# Patient Record
Sex: FEMALE | Race: WHITE | NOT HISPANIC OR LATINO
[De-identification: names, ages, dates, MRNs, and addresses within clinical notes are randomized per-mention and may not be internally consistent; named-entity substitution may affect disease eponyms.]

---

## 2021-09-16 PROBLEM — Z82.49 FAMILY HISTORY OF HYPERTENSION: Status: ACTIVE | Noted: 2021-09-16

## 2021-09-16 PROBLEM — Z83.3 FAMILY HISTORY OF TYPE 2 DIABETES MELLITUS: Status: ACTIVE | Noted: 2021-09-16

## 2021-09-16 PROBLEM — Z82.3 FAMILY HISTORY OF CEREBROVASCULAR ACCIDENT (CVA): Status: ACTIVE | Noted: 2021-09-16

## 2021-09-16 PROBLEM — Z86.018 HISTORY OF UTERINE LEIOMYOMA: Status: RESOLVED | Noted: 2021-09-16 | Resolved: 2021-09-16

## 2021-09-17 ENCOUNTER — APPOINTMENT (OUTPATIENT)
Dept: ELECTROPHYSIOLOGY | Facility: CLINIC | Age: 51
End: 2021-09-17
Payer: COMMERCIAL

## 2021-09-17 ENCOUNTER — NON-APPOINTMENT (OUTPATIENT)
Age: 51
End: 2021-09-17

## 2021-09-17 VITALS — WEIGHT: 245 LBS

## 2021-09-17 VITALS
BODY MASS INDEX: 47.85 KG/M2 | HEIGHT: 60 IN | DIASTOLIC BLOOD PRESSURE: 78 MMHG | SYSTOLIC BLOOD PRESSURE: 122 MMHG | HEART RATE: 63 BPM | OXYGEN SATURATION: 98 %

## 2021-09-17 DIAGNOSIS — Z78.9 OTHER SPECIFIED HEALTH STATUS: ICD-10-CM

## 2021-09-17 DIAGNOSIS — Z82.49 FAMILY HISTORY OF ISCHEMIC HEART DISEASE AND OTHER DISEASES OF THE CIRCULATORY SYSTEM: ICD-10-CM

## 2021-09-17 DIAGNOSIS — Z80.3 FAMILY HISTORY OF MALIGNANT NEOPLASM OF BREAST: ICD-10-CM

## 2021-09-17 DIAGNOSIS — Z86.018 PERSONAL HISTORY OF OTHER BENIGN NEOPLASM: ICD-10-CM

## 2021-09-17 DIAGNOSIS — Z82.3 FAMILY HISTORY OF STROKE: ICD-10-CM

## 2021-09-17 DIAGNOSIS — R00.2 PALPITATIONS: ICD-10-CM

## 2021-09-17 DIAGNOSIS — Z83.3 FAMILY HISTORY OF DIABETES MELLITUS: ICD-10-CM

## 2021-09-17 PROCEDURE — 99203 OFFICE O/P NEW LOW 30 MIN: CPT

## 2021-09-17 PROCEDURE — 93000 ELECTROCARDIOGRAM COMPLETE: CPT

## 2021-09-17 NOTE — DISCUSSION/SUMMARY
[FreeTextEntry1] : 51 year old female with past medical hx of obesity, uterine fibroids, mild mitral regurgitation, presents for evaluation and management of palpitations.  \par \par 1. Palpitations: ECG obtained to evaluate for arrhythmias that could be causing symptoms, but showed normal sinus rhythm.  30 day monitor arranged for patient but she only wore for 7 day and she did not have to cough to stop the arrhythmia during the monitoring period.  We will therefore send the monitor back to patient such that she can record her most significant symptom particularly the one that she stops with coughing. Patient did have PVCs and one couplet, and reassured patient so far that there was not anything life threatening.  She had a normal nuclear stress test and echocardiogram. \par \par \par Sincerely,\par \par Cody Madera MD PROVIDER:[TOKEN:[[id number]]]

## 2021-09-17 NOTE — PHYSICAL EXAM
[Well Developed] : well developed [Well Nourished] : well nourished [No Acute Distress] : no acute distress [Obese] : obese [Normal Conjunctiva] : normal conjunctiva [Normal Venous Pressure] : normal venous pressure [No Carotid Bruit] : no carotid bruit [Normal S1, S2] : normal S1, S2 [No Murmur] : no murmur [No Rub] : no rub [No Gallop] : no gallop [Clear Lung Fields] : clear lung fields [Good Air Entry] : good air entry [No Respiratory Distress] : no respiratory distress  [Soft] : abdomen soft [Non Tender] : non-tender [No Masses/organomegaly] : no masses/organomegaly [Normal Bowel Sounds] : normal bowel sounds [Normal Gait] : normal gait [No Cyanosis] : no cyanosis [No Clubbing] : no clubbing [No Varicosities] : no varicosities [No Rash] : no rash [No Skin Lesions] : no skin lesions [Moves all extremities] : moves all extremities [No Focal Deficits] : no focal deficits [Normal Speech] : normal speech [Alert and Oriented] : alert and oriented [Normal memory] : normal memory [de-identified] : mild ankle edema

## 2021-09-17 NOTE — HISTORY OF PRESENT ILLNESS
[FreeTextEntry1] : 51 year old female with past medical hx of obesity, uterine fibroids, mild mitral regurgitation, presents for evaluation and management of palpitations.  \par \par Patient reports palpitations x one year described as palpitations at throat in which she can feel heart pounding. symptom last for 10 years, can occur 1-2x daily and sometimes none. palpitations is relieved with cough. \par \par Patient wore a Holter monitor for 7 days. lowest HR 50bpm, avg HR 71bpm and highest HR 120bpm. 1% PACs and 1% PVCs. Patient had one symptomatic event on 8/18/21 when she had heart racing. sinus rhythm with ventricular couplet and PAC noted on Holter monitor. \par \par As per the patient, she had an echo with Dr. Paredes 10/2020 which was normal.

## 2022-02-20 ENCOUNTER — INPATIENT (INPATIENT)
Facility: HOSPITAL | Age: 52
LOS: 1 days | Discharge: HOME | DRG: 372 | End: 2022-02-22
Attending: SURGERY | Admitting: SURGERY
Payer: COMMERCIAL

## 2022-02-20 VITALS
RESPIRATION RATE: 20 BRPM | DIASTOLIC BLOOD PRESSURE: 76 MMHG | HEART RATE: 83 BPM | OXYGEN SATURATION: 97 % | HEIGHT: 60 IN | WEIGHT: 240.08 LBS | TEMPERATURE: 98 F | SYSTOLIC BLOOD PRESSURE: 123 MMHG

## 2022-02-20 DIAGNOSIS — Z90.89 ACQUIRED ABSENCE OF OTHER ORGANS: Chronic | ICD-10-CM

## 2022-02-20 DIAGNOSIS — K37 UNSPECIFIED APPENDICITIS: ICD-10-CM

## 2022-02-20 DIAGNOSIS — Z98.89 OTHER SPECIFIED POSTPROCEDURAL STATES: Chronic | ICD-10-CM

## 2022-02-20 LAB
ALBUMIN SERPL ELPH-MCNC: 4.3 G/DL — SIGNIFICANT CHANGE UP (ref 3.3–5)
ALP SERPL-CCNC: 72 U/L — SIGNIFICANT CHANGE UP (ref 40–120)
ALT FLD-CCNC: 40 U/L — SIGNIFICANT CHANGE UP (ref 10–45)
ANION GAP SERPL CALC-SCNC: 13 MMOL/L — SIGNIFICANT CHANGE UP (ref 5–17)
APPEARANCE UR: CLEAR — SIGNIFICANT CHANGE UP
APTT BLD: 29.1 SEC — SIGNIFICANT CHANGE UP (ref 27.5–35.5)
AST SERPL-CCNC: 17 U/L — SIGNIFICANT CHANGE UP (ref 10–40)
BACTERIA # UR AUTO: NEGATIVE — SIGNIFICANT CHANGE UP
BASOPHILS # BLD AUTO: 0.05 K/UL — SIGNIFICANT CHANGE UP (ref 0–0.2)
BASOPHILS NFR BLD AUTO: 0.3 % — SIGNIFICANT CHANGE UP (ref 0–2)
BILIRUB SERPL-MCNC: 0.9 MG/DL — SIGNIFICANT CHANGE UP (ref 0.2–1.2)
BILIRUB UR-MCNC: NEGATIVE — SIGNIFICANT CHANGE UP
BLD GP AB SCN SERPL QL: NEGATIVE — SIGNIFICANT CHANGE UP
BUN SERPL-MCNC: 8 MG/DL — SIGNIFICANT CHANGE UP (ref 7–23)
CALCIUM SERPL-MCNC: 9.6 MG/DL — SIGNIFICANT CHANGE UP (ref 8.4–10.5)
CHLORIDE SERPL-SCNC: 96 MMOL/L — SIGNIFICANT CHANGE UP (ref 96–108)
CO2 SERPL-SCNC: 21 MMOL/L — LOW (ref 22–31)
COLOR SPEC: SIGNIFICANT CHANGE UP
CREAT SERPL-MCNC: 0.59 MG/DL — SIGNIFICANT CHANGE UP (ref 0.5–1.3)
DIFF PNL FLD: NEGATIVE — SIGNIFICANT CHANGE UP
EOSINOPHIL # BLD AUTO: 0.01 K/UL — SIGNIFICANT CHANGE UP (ref 0–0.5)
EOSINOPHIL NFR BLD AUTO: 0.1 % — SIGNIFICANT CHANGE UP (ref 0–6)
EPI CELLS # UR: 3 /HPF — SIGNIFICANT CHANGE UP
GLUCOSE SERPL-MCNC: 113 MG/DL — HIGH (ref 70–99)
GLUCOSE UR QL: NEGATIVE — SIGNIFICANT CHANGE UP
HCG SERPL-ACNC: <2 MIU/ML — SIGNIFICANT CHANGE UP
HCT VFR BLD CALC: 38.4 % — SIGNIFICANT CHANGE UP (ref 34.5–45)
HGB BLD-MCNC: 12 G/DL — SIGNIFICANT CHANGE UP (ref 11.5–15.5)
HIV 1+2 AB+HIV1 P24 AG SERPL QL IA: SIGNIFICANT CHANGE UP
HYALINE CASTS # UR AUTO: 1 /LPF — SIGNIFICANT CHANGE UP (ref 0–2)
IMM GRANULOCYTES NFR BLD AUTO: 0.6 % — SIGNIFICANT CHANGE UP (ref 0–1.5)
INR BLD: 1.14 RATIO — SIGNIFICANT CHANGE UP (ref 0.88–1.16)
KETONES UR-MCNC: NEGATIVE — SIGNIFICANT CHANGE UP
LEUKOCYTE ESTERASE UR-ACNC: NEGATIVE — SIGNIFICANT CHANGE UP
LIDOCAIN IGE QN: 13 U/L — SIGNIFICANT CHANGE UP (ref 7–60)
LYMPHOCYTES # BLD AUTO: 1.01 K/UL — SIGNIFICANT CHANGE UP (ref 1–3.3)
LYMPHOCYTES # BLD AUTO: 5.9 % — LOW (ref 13–44)
MCHC RBC-ENTMCNC: 25.7 PG — LOW (ref 27–34)
MCHC RBC-ENTMCNC: 31.3 GM/DL — LOW (ref 32–36)
MCV RBC AUTO: 82.2 FL — SIGNIFICANT CHANGE UP (ref 80–100)
MONOCYTES # BLD AUTO: 1.21 K/UL — HIGH (ref 0–0.9)
MONOCYTES NFR BLD AUTO: 7.1 % — SIGNIFICANT CHANGE UP (ref 2–14)
NEUTROPHILS # BLD AUTO: 14.64 K/UL — HIGH (ref 1.8–7.4)
NEUTROPHILS NFR BLD AUTO: 86 % — HIGH (ref 43–77)
NITRITE UR-MCNC: NEGATIVE — SIGNIFICANT CHANGE UP
NRBC # BLD: 0 /100 WBCS — SIGNIFICANT CHANGE UP (ref 0–0)
PH UR: 6 — SIGNIFICANT CHANGE UP (ref 5–8)
PLATELET # BLD AUTO: 354 K/UL — SIGNIFICANT CHANGE UP (ref 150–400)
POTASSIUM SERPL-MCNC: 4 MMOL/L — SIGNIFICANT CHANGE UP (ref 3.5–5.3)
POTASSIUM SERPL-SCNC: 4 MMOL/L — SIGNIFICANT CHANGE UP (ref 3.5–5.3)
PROT SERPL-MCNC: 7.1 G/DL — SIGNIFICANT CHANGE UP (ref 6–8.3)
PROT UR-MCNC: ABNORMAL
PROTHROM AB SERPL-ACNC: 13.6 SEC — SIGNIFICANT CHANGE UP (ref 10.6–13.6)
RBC # BLD: 4.67 M/UL — SIGNIFICANT CHANGE UP (ref 3.8–5.2)
RBC # FLD: 14.7 % — HIGH (ref 10.3–14.5)
RBC CASTS # UR COMP ASSIST: 12 /HPF — HIGH (ref 0–4)
RH IG SCN BLD-IMP: POSITIVE — SIGNIFICANT CHANGE UP
SARS-COV-2 RNA SPEC QL NAA+PROBE: SIGNIFICANT CHANGE UP
SODIUM SERPL-SCNC: 130 MMOL/L — LOW (ref 135–145)
SP GR SPEC: 1.02 — SIGNIFICANT CHANGE UP (ref 1.01–1.02)
UROBILINOGEN FLD QL: NEGATIVE — SIGNIFICANT CHANGE UP
WBC # BLD: 17.02 K/UL — HIGH (ref 3.8–10.5)
WBC # FLD AUTO: 17.02 K/UL — HIGH (ref 3.8–10.5)
WBC UR QL: 4 /HPF — SIGNIFICANT CHANGE UP (ref 0–5)

## 2022-02-20 PROCEDURE — 99285 EMERGENCY DEPT VISIT HI MDM: CPT

## 2022-02-20 PROCEDURE — 99222 1ST HOSP IP/OBS MODERATE 55: CPT

## 2022-02-20 PROCEDURE — 74177 CT ABD & PELVIS W/CONTRAST: CPT | Mod: 26,MD

## 2022-02-20 RX ORDER — PIPERACILLIN AND TAZOBACTAM 4; .5 G/20ML; G/20ML
3.38 INJECTION, POWDER, LYOPHILIZED, FOR SOLUTION INTRAVENOUS ONCE
Refills: 0 | Status: COMPLETED | OUTPATIENT
Start: 2022-02-20 | End: 2022-02-20

## 2022-02-20 RX ORDER — ENOXAPARIN SODIUM 100 MG/ML
40 INJECTION SUBCUTANEOUS EVERY 24 HOURS
Refills: 0 | Status: DISCONTINUED | OUTPATIENT
Start: 2022-02-20 | End: 2022-02-22

## 2022-02-20 RX ORDER — SODIUM CHLORIDE 9 MG/ML
1000 INJECTION, SOLUTION INTRAVENOUS
Refills: 0 | Status: ACTIVE | OUTPATIENT
Start: 2022-02-20 | End: 2023-01-19

## 2022-02-20 RX ORDER — METOCLOPRAMIDE HCL 10 MG
10 TABLET ORAL ONCE
Refills: 0 | Status: COMPLETED | OUTPATIENT
Start: 2022-02-20 | End: 2022-02-20

## 2022-02-20 RX ORDER — HYDROMORPHONE HYDROCHLORIDE 2 MG/ML
0.5 INJECTION INTRAMUSCULAR; INTRAVENOUS; SUBCUTANEOUS EVERY 4 HOURS
Refills: 0 | Status: DISCONTINUED | OUTPATIENT
Start: 2022-02-20 | End: 2022-02-21

## 2022-02-20 RX ORDER — MORPHINE SULFATE 50 MG/1
2 CAPSULE, EXTENDED RELEASE ORAL ONCE
Refills: 0 | Status: DISCONTINUED | OUTPATIENT
Start: 2022-02-20 | End: 2022-02-20

## 2022-02-20 RX ORDER — MORPHINE SULFATE 50 MG/1
4 CAPSULE, EXTENDED RELEASE ORAL ONCE
Refills: 0 | Status: DISCONTINUED | OUTPATIENT
Start: 2022-02-20 | End: 2022-02-20

## 2022-02-20 RX ORDER — HYDROMORPHONE HYDROCHLORIDE 2 MG/ML
0.25 INJECTION INTRAMUSCULAR; INTRAVENOUS; SUBCUTANEOUS EVERY 4 HOURS
Refills: 0 | Status: DISCONTINUED | OUTPATIENT
Start: 2022-02-20 | End: 2022-02-21

## 2022-02-20 RX ORDER — ONDANSETRON 8 MG/1
4 TABLET, FILM COATED ORAL ONCE
Refills: 0 | Status: COMPLETED | OUTPATIENT
Start: 2022-02-20 | End: 2022-02-20

## 2022-02-20 RX ORDER — PIPERACILLIN AND TAZOBACTAM 4; .5 G/20ML; G/20ML
3.38 INJECTION, POWDER, LYOPHILIZED, FOR SOLUTION INTRAVENOUS EVERY 8 HOURS
Refills: 0 | Status: DISCONTINUED | OUTPATIENT
Start: 2022-02-21 | End: 2022-02-22

## 2022-02-20 RX ORDER — SODIUM CHLORIDE 9 MG/ML
1000 INJECTION INTRAMUSCULAR; INTRAVENOUS; SUBCUTANEOUS ONCE
Refills: 0 | Status: COMPLETED | OUTPATIENT
Start: 2022-02-20 | End: 2022-02-20

## 2022-02-20 RX ORDER — ACETAMINOPHEN 500 MG
1000 TABLET ORAL ONCE
Refills: 0 | Status: COMPLETED | OUTPATIENT
Start: 2022-02-20 | End: 2022-02-20

## 2022-02-20 RX ORDER — ACETAMINOPHEN 500 MG
1000 TABLET ORAL EVERY 6 HOURS
Refills: 0 | Status: COMPLETED | OUTPATIENT
Start: 2022-02-20 | End: 2022-02-21

## 2022-02-20 RX ORDER — SODIUM CHLORIDE 9 MG/ML
1000 INJECTION, SOLUTION INTRAVENOUS
Refills: 0 | Status: DISCONTINUED | OUTPATIENT
Start: 2022-02-20 | End: 2022-02-21

## 2022-02-20 RX ADMIN — SODIUM CHLORIDE 125 MILLILITER(S): 9 INJECTION, SOLUTION INTRAVENOUS at 20:28

## 2022-02-20 RX ADMIN — Medication 1000 MILLIGRAM(S): at 14:34

## 2022-02-20 RX ADMIN — Medication 1000 MILLIGRAM(S): at 11:51

## 2022-02-20 RX ADMIN — SODIUM CHLORIDE 1000 MILLILITER(S): 9 INJECTION INTRAMUSCULAR; INTRAVENOUS; SUBCUTANEOUS at 11:27

## 2022-02-20 RX ADMIN — SODIUM CHLORIDE 1000 MILLILITER(S): 9 INJECTION INTRAMUSCULAR; INTRAVENOUS; SUBCUTANEOUS at 12:35

## 2022-02-20 RX ADMIN — ONDANSETRON 4 MILLIGRAM(S): 8 TABLET, FILM COATED ORAL at 11:30

## 2022-02-20 RX ADMIN — Medication 400 MILLIGRAM(S): at 11:36

## 2022-02-20 RX ADMIN — PIPERACILLIN AND TAZOBACTAM 200 GRAM(S): 4; .5 INJECTION, POWDER, LYOPHILIZED, FOR SOLUTION INTRAVENOUS at 15:58

## 2022-02-20 RX ADMIN — Medication 10 MILLIGRAM(S): at 14:38

## 2022-02-20 RX ADMIN — MORPHINE SULFATE 2 MILLIGRAM(S): 50 CAPSULE, EXTENDED RELEASE ORAL at 17:40

## 2022-02-20 NOTE — H&P ADULT - NSHPLABSRESULTS_GEN_ALL_CORE
12.0   17.02 )-----------( 354      ( 2022 11:55 )             38.4           130<L>  |  96  |  8   ----------------------------<  113<H>  4.0   |  21<L>  |  0.59    Ca    9.6      2022 11:55    TPro  7.1  /  Alb  4.3  /  TBili  0.9  /  DBili  x   /  AST  17  /  ALT  40  /  AlkPhos  72                Urinalysis Basic - ( 2022 11:55 )    Color: Light Yellow / Appearance: Clear / S.018 / pH: x  Gluc: x / Ketone: Negative  / Bili: Negative / Urobili: Negative   Blood: x / Protein: Trace / Nitrite: Negative   Leuk Esterase: Negative / RBC: 12 /hpf / WBC 4 /HPF   Sq Epi: x / Non Sq Epi: 3 /hpf / Bacteria: Negative        PT/INR - ( 2022 17:44 )   PT: 13.6 sec;   INR: 1.14 ratio         PTT - ( 2022 17:44 )  PTT:29.1 sec          RADIOLOGY AND ADDITIONAL STUDIES:         INTERPRETATION:  CLINICAL INFORMATION: Evaluate for appendicitis    COMPARISON: None.    CONTRAST/COMPLICATIONS:  IV Contrast: Omnipaque 350 90 cc administered   10 cc discarded  Oral Contrast: NONE  Complications: None reported at time of study completion    PROCEDURE:  CT of the Abdomen and Pelvis was performed.  Sagittal and coronal reformats were performed.        IMPRESSION:  Findings compatible with acute appendicitis. There are adjacent   inflammatory changes. Small microperforation cannot be excluded.    Multiple enlarged abdominal lymph nodes including the above-mentioned   mesenteric lymph nodes at the root of the mesentery, largest measuring 4   mm x 3.1 cm. Additional smaller retroperitoneal lymph nodes. Findings are   suggestive of lymphoma. Workup should be performed.    Enlarged bulbous uterus suggests adenomyosis and/or leiomyomatous   disease. Correlation with pelvic ultrasound should be performed. Small   hypodensity left adnexal region also can be evaluated with sonography.    1 cm left lower lobe pulmonary nodule. No previous chest CT examination   for comparison. In view of the findingswithin the abdomen, recommend   chest CT examination.    Findings discussed with Dr. Jeter by Dr. Crawford on 2022 at   3:01 PM.

## 2022-02-20 NOTE — H&P ADULT - ASSESSMENT
53yo F w/ hx of myomectomy presenting with 1 days of RLQ pain. CT a/p has findings compatible with acute appendicitis with additional findings of enlarged lymph notes     - Pt with significant inflammatory changes and suggestion of microperforation   - No acute surgical intervention   - IV abx   - Serial abdominal exams   - Pain control   - Discussed with Dr. Zen Harper PGY3  ATP   [id number]

## 2022-02-20 NOTE — ED PROVIDER NOTE - OBJECTIVE STATEMENT
51YO F hx fibroids, myomectomy, p/w RLQ abd pain x1d. nonradiating. a/w n/v. denies fevers, flank pain, dysuria, diarrhea. LMP 2w prior. last BM 2d prior, has not passed gas since then but no abd distention or abdominal surgical hx. 53YO F hx fibroids, myomectomy, p/w RLQ abd pain x1d. nonradiating. a/w n/v. denies fevers, flank pain, dysuria, diarrhea. LMP 2w prior. last BM 2d prior, has not passed gas since then but no abd distention or abdominal surgical hx.     Attn- pt seen in Rm17 - agree with above - pt c/o constant "pulling" pain 8/10 RLQ that started gradually yesterday am. pt had anorexia since night before.  Feels feverish, but no fever measured. no chills.  no resp or  symptoms.  decreased BM.  +N/V - NBNB,   no radiation.  started in RLQ.   LMP 2 weeks ago.  no trauma or injury to abdo.  PMHx - neg, PSHx - myomectomy.

## 2022-02-20 NOTE — H&P ADULT - NSICDXFAMILYHX_GEN_ALL_CORE_FT
FAMILY HISTORY:  FH: gastric cancer    Father  Still living? Unknown  Family history of type 2 diabetes mellitus in father, Age at diagnosis: Age Unknown  Hypertension, Age at diagnosis: Age Unknown    Mother  Still living? Yes, Estimated age: 71-80  Family history of stroke, Age at diagnosis: Age Unknown  FH: breast cancer, Age at diagnosis: Age Unknown  FH: gastric cancer, Age at diagnosis: Age Unknown  Hypertension, Age at diagnosis: Age Unknown    Sibling  Still living? Unknown  FH: breast cancer, Age at diagnosis: Age Unknown

## 2022-02-20 NOTE — ED PROVIDER NOTE - CLINICAL SUMMARY MEDICAL DECISION MAKING FREE TEXT BOX
Carol Jeter MD, PGY-2: 51YO F hx fibroids, myomectomy, p/w RLQ abd pain + n/v x1d. VSS, PE RLQ TTP. concern for appy, low suspicion mesenteric ischemia (not diffuse, no risk factors), low suspicion torsion given duration of symptoms and abd exam. plan for basic labs, ua, ct a/p, hydration. Carol Jeter MD, PGY-2: 53YO F hx fibroids, myomectomy, p/w RLQ abd pain + n/v x1d. VSS, PE RLQ TTP. concern for appy, low suspicion mesenteric ischemia (not diffuse, no risk factors), low suspicion torsion given duration of symptoms and abd exam. plan for basic labs, ua, ct a/p, hydration.     Attn - [facility] - likely acute AP - analgesia, CT abdo/pelvis, reassess.

## 2022-02-20 NOTE — H&P ADULT - NSHPPHYSICALEXAM_GEN_ALL_CORE
PHYSICAL EXAM:  GENERAL: NAD  HEAD:  Atraumatic, Normocephalic  EYES: EOMI, PERRLA, conjunctiva and sclera clear  ENT: Moist mucous membranes  NECK: Supple  CHEST/LUNG: Unlabored respirations  HEART: Regular rate and rhythm  ABDOMEN: Softly distended. RLQ moderately TTP. No rebound or guarding.   EXTREMITIES:  2+ Peripheral Pulses  NERVOUS SYSTEM:  Alert & Oriented X3, speech clear  SKIN: No rashes or lesions

## 2022-02-20 NOTE — ED ADULT NURSE REASSESSMENT NOTE - NS ED NURSE REASSESS COMMENT FT1
A/O x3. Still has pain on movement, 7/10, declines pain meds at present. Requesting something for nausea. MD notified.

## 2022-02-20 NOTE — ED ADULT NURSE NOTE - CHPI ED NUR SYMPTOMS POS
PHYSICIAN NEXT STEPS:  Review Only    CHIEF COMPLAINT:  Chief Complaint/Protocol Used: appointment  Onset: appointment      ASSESSMENT:  ? Onset: appointment  ? Normal True  -------------------------------------------------------    DISPOSITION:  Disposition Recommendation: Unspecified  Patient Directed To: Unspecified  Patient Intended Action: Other      CALL NOTES:  07/12/2021 at 9:46 AM by Dianelys Bower  ? Patient requests an appointment for a mammogram.  Transferred to [facility] for assistance.    DISPOSITION OVERRIDE/PROVIDER CONSULT:  Disposition Override: Call PCP Within 24 Hours  Override Source: Unspecified  Consulted with PCP: No  Consulted with On-Call Physician: No    CALLER CONTACT INFO:  Name: Michelle Reeves (Self)  Phone 1: [phone] (Mobile) - Preferred      ENCOUNTER STARTED:  07/12/21 09:33:52 AM  ENCOUNTER ASSIGNED TO/CLOSED BY:  Dianelys Bower @ 07/12/21 09:51:34 AM      -------------------------------------------------------    -------------------------------------------------------  
NAUSEA/PAIN/VOMITING

## 2022-02-20 NOTE — ED PROVIDER NOTE - PHYSICAL EXAMINATION
Gen: WDWN, NAD  HEENT: EOMI, no nasal discharge, mucous membranes mildly dry  CV: 2+ radial pulses b/l  Resp: no accessory muscle use, no increased work of breathing  GI: Abdomen soft non-distended, + RLQ TTP  MSK: No open wounds, no bruising, no LE edema  Neuro: A&Ox4, following commands, moving all four extremities spontaneously  Psych: appropriate mood Gen: WDWN, NAD  HEENT: EOMI, no nasal discharge, mucous membranes mildly dry  CV: 2+ radial pulses b/l  Resp: no accessory muscle use, no increased work of breathing  GI: Abdomen soft non-distended, + RLQ TTP  MSK: No open wounds, no bruising, no LE edema  Neuro: A&Ox4, following commands, moving all four extremities spontaneously  Psych: appropriate mood     Attn - alert, NAD, no pallor or jaundice, PERRL 3 mm, moist mm, skin - warm and dry, Lungs - clear, no w/r/r, good BS bilaterally, Cor - rr, no M, no rub, Abdo - obese, ND, soft, tender at [place]Burney's, +pain to RLQ with cough, no HSM, no CVAT, no guarding or rebound. Extremities - no edema, no calf tenderness, distal pulses intact and symmetrical, Neuro - intact and non-focal

## 2022-02-20 NOTE — ED PROVIDER NOTE - CHILD ABUSE FACILITY
Returned patient's call to reschedule [facility] appt from 10/9/17. Pt stated in message that she now has POI.  
[facility]

## 2022-02-20 NOTE — ED PROVIDER NOTE - PROGRESS NOTE DETAILS
Carol Jeter MD, PGY-2: surgery initially paged at 315, spoke with pt now, has evaluated the patient but has not spoken with attending. Carol Jeter MD, PGY-2: surgery evaluated the patient, accepted to their service. does not need to be NPO tonight. will plan for surgery tomorrow.

## 2022-02-20 NOTE — H&P ADULT - HISTORY OF PRESENT ILLNESS
51yo F w/ hx of myomectomy presenting with 1 days of RLQ pain. Pt states yesterday afternoon she suddenly developed severe RLQ pain with associated nausea and emesis. She states that since onset of symptoms she has been unable to tolerate PO 51yo F w/ hx of myomectomy presenting with 1 days of RLQ pain. Pt states yesterday afternoon she suddenly developed severe RLQ pain with associated nausea and emesis. She states that since onset of symptoms she has been unable to tolerate PO and has had "constant chills". She states she has noticed mild RLQ pain over the last year, however it was transient and notable less severe. Pt has not passed gas in a day but reports normal bowel habits. She denies fever, cp, sob, recent illness, hematochezia, urinary symptoms, unintentional weight loss, cough or night sweats. Colonoscopy at age 40 was WNL.     In ED pt afebrile and Hd stable. Labs remarkable for hyponatremia and WBC of 17. CT a/p has findings compatible with acute appendicitis. Small microperforation cannot be excluded. Also noted multiple enlarged abdominal lymph nodes and additional smaller retroperitoneal lymph nodes suggestive of lymphoma.

## 2022-02-20 NOTE — ED PROVIDER NOTE - NS ED ROS FT
Gen: Denies fevers  CV: Denies chest pain  Resp: Denies SOB  GI: + abd pain, nausea, vomiting  : Denies dysuria, flank pain  all other ROS negative unless indicated in HPI

## 2022-02-20 NOTE — H&P ADULT - BIRTH SEX
Female You can access the FollowMyHealth Patient Portal offered by [facility] by registering at the following website: [email or web address]. By joining [facility]’s FollowMyHealth portal, you will also be able to view your health information using other applications (apps) compatible with our system.

## 2022-02-20 NOTE — H&P ADULT - TIME BILLING
findings consistent with acute appendicitis likely small perforation  options discussed with patient  patient electing for attempted non operative management for this stage  will ask for oncology input regarding enlarged mesenteric lymph nodes  I.V. antibiotics, NPO for now with IVF

## 2022-02-20 NOTE — ED PROVIDER NOTE - NSICDXFAMILYHX_GEN_ALL_CORE_FT
FAMILY HISTORY:  Father  Still living? Unknown  Family history of type 2 diabetes mellitus in father, Age at diagnosis: Age Unknown  Hypertension, Age at diagnosis: Age Unknown    Mother  Still living? Yes, Estimated age: 71-80  Family history of stroke, Age at diagnosis: Age Unknown  Hypertension, Age at diagnosis: Age Unknown

## 2022-02-20 NOTE — ED ADULT NURSE NOTE - OBJECTIVE STATEMENT
Pt is a 53 yo F who came to the ED amb c/o abd pain since yesterday. Pain is in the right periumbilical area, a "pulling" sensation with vomiting and diarrhea. No blood noted. No pain at rest, intense pain with movement.+ chills, no fever. A/O x3.

## 2022-02-21 LAB
ANION GAP SERPL CALC-SCNC: 11 MMOL/L — SIGNIFICANT CHANGE UP (ref 5–17)
BUN SERPL-MCNC: 8 MG/DL — SIGNIFICANT CHANGE UP (ref 7–23)
CALCIUM SERPL-MCNC: 8.8 MG/DL — SIGNIFICANT CHANGE UP (ref 8.4–10.5)
CHLORIDE SERPL-SCNC: 101 MMOL/L — SIGNIFICANT CHANGE UP (ref 96–108)
CO2 SERPL-SCNC: 25 MMOL/L — SIGNIFICANT CHANGE UP (ref 22–31)
CREAT SERPL-MCNC: 0.73 MG/DL — SIGNIFICANT CHANGE UP (ref 0.5–1.3)
CULTURE RESULTS: SIGNIFICANT CHANGE UP
GLUCOSE SERPL-MCNC: 98 MG/DL — SIGNIFICANT CHANGE UP (ref 70–99)
HCT VFR BLD CALC: 35.3 % — SIGNIFICANT CHANGE UP (ref 34.5–45)
HGB BLD-MCNC: 11 G/DL — LOW (ref 11.5–15.5)
MAGNESIUM SERPL-MCNC: 2.1 MG/DL — SIGNIFICANT CHANGE UP (ref 1.6–2.6)
MCHC RBC-ENTMCNC: 25.9 PG — LOW (ref 27–34)
MCHC RBC-ENTMCNC: 31.2 GM/DL — LOW (ref 32–36)
MCV RBC AUTO: 83.3 FL — SIGNIFICANT CHANGE UP (ref 80–100)
NRBC # BLD: 0 /100 WBCS — SIGNIFICANT CHANGE UP (ref 0–0)
PHOSPHATE SERPL-MCNC: 2.9 MG/DL — SIGNIFICANT CHANGE UP (ref 2.5–4.5)
PLATELET # BLD AUTO: 262 K/UL — SIGNIFICANT CHANGE UP (ref 150–400)
POTASSIUM SERPL-MCNC: 3.9 MMOL/L — SIGNIFICANT CHANGE UP (ref 3.5–5.3)
POTASSIUM SERPL-SCNC: 3.9 MMOL/L — SIGNIFICANT CHANGE UP (ref 3.5–5.3)
RBC # BLD: 4.24 M/UL — SIGNIFICANT CHANGE UP (ref 3.8–5.2)
RBC # FLD: 14.9 % — HIGH (ref 10.3–14.5)
SODIUM SERPL-SCNC: 137 MMOL/L — SIGNIFICANT CHANGE UP (ref 135–145)
SPECIMEN SOURCE: SIGNIFICANT CHANGE UP
WBC # BLD: 13.68 K/UL — HIGH (ref 3.8–10.5)
WBC # FLD AUTO: 13.68 K/UL — HIGH (ref 3.8–10.5)

## 2022-02-21 PROCEDURE — 99231 SBSQ HOSP IP/OBS SF/LOW 25: CPT

## 2022-02-21 RX ORDER — INFLUENZA VIRUS VACCINE 15; 15; 15; 15 UG/.5ML; UG/.5ML; UG/.5ML; UG/.5ML
0.5 SUSPENSION INTRAMUSCULAR ONCE
Refills: 0 | Status: DISCONTINUED | OUTPATIENT
Start: 2022-02-21 | End: 2022-02-22

## 2022-02-21 RX ORDER — OXYCODONE HYDROCHLORIDE 5 MG/1
5 TABLET ORAL EVERY 4 HOURS
Refills: 0 | Status: DISCONTINUED | OUTPATIENT
Start: 2022-02-21 | End: 2022-02-22

## 2022-02-21 RX ORDER — SODIUM CHLORIDE 9 MG/ML
1000 INJECTION INTRAMUSCULAR; INTRAVENOUS; SUBCUTANEOUS
Refills: 0 | Status: DISCONTINUED | OUTPATIENT
Start: 2022-02-21 | End: 2022-02-21

## 2022-02-21 RX ADMIN — PIPERACILLIN AND TAZOBACTAM 25 GRAM(S): 4; .5 INJECTION, POWDER, LYOPHILIZED, FOR SOLUTION INTRAVENOUS at 17:01

## 2022-02-21 RX ADMIN — Medication 62.5 MILLIMOLE(S): at 11:49

## 2022-02-21 RX ADMIN — Medication 400 MILLIGRAM(S): at 11:01

## 2022-02-21 RX ADMIN — Medication 1000 MILLIGRAM(S): at 17:15

## 2022-02-21 RX ADMIN — PIPERACILLIN AND TAZOBACTAM 25 GRAM(S): 4; .5 INJECTION, POWDER, LYOPHILIZED, FOR SOLUTION INTRAVENOUS at 01:17

## 2022-02-21 RX ADMIN — Medication 400 MILLIGRAM(S): at 17:01

## 2022-02-21 RX ADMIN — Medication 1000 MILLIGRAM(S): at 00:29

## 2022-02-21 RX ADMIN — Medication 400 MILLIGRAM(S): at 05:32

## 2022-02-21 RX ADMIN — Medication 1000 MILLIGRAM(S): at 11:15

## 2022-02-21 RX ADMIN — PIPERACILLIN AND TAZOBACTAM 25 GRAM(S): 4; .5 INJECTION, POWDER, LYOPHILIZED, FOR SOLUTION INTRAVENOUS at 09:03

## 2022-02-21 RX ADMIN — Medication 400 MILLIGRAM(S): at 00:03

## 2022-02-21 RX ADMIN — ENOXAPARIN SODIUM 40 MILLIGRAM(S): 100 INJECTION SUBCUTANEOUS at 11:01

## 2022-02-21 RX ADMIN — SODIUM CHLORIDE 125 MILLILITER(S): 9 INJECTION INTRAMUSCULAR; INTRAVENOUS; SUBCUTANEOUS at 01:24

## 2022-02-21 NOTE — PROGRESS NOTE ADULT - ATTENDING COMMENTS
Pt seen and examined with ACS team on 2/21, agree with above. Pt feeling better, less pain than on presentation.    1. Uncomplicated appendicitis:  - Continue non-operative management. No appendicolith on CT, so no need for interval appendectomy unless patient has recurrent or prolonged symptoms.  - Will need outpatient colonoscopy in 6-8 weeks to ensure appendicitis was not caused by mass  - Advance to [facility]    2. Lymphadenopathy and pulmonary nodule:  - Heme-Onc consultation Pt seen and examined with ACS team on 2/21, agree with above. Pt feeling better, less pain than on presentation.    1. Uncomplicated appendicitis:  - Continue non-operative management. No appendicolith on CT, so no need for interval appendectomy unless patient has recurrent or prolonged symptoms.  - Will need outpatient colonoscopy in 6-8 weeks to ensure appendicitis was not caused by mass  - Advance to [facility]D    2. Lymphadenopathy and pulmonary nodule:  - Heme-Onc consultation  - I discussed these findings with patient - she was already aware and is amenable to planned Heme-Onc consultation

## 2022-02-21 NOTE — CONSULT NOTE ADULT - SUBJECTIVE AND OBJECTIVE BOX
CHIEF COMPLAINT: abd pain    HISTORY OF PRESENT ILLNESS:  51yo F w/ hx of myomectomy presenting with 1 days of RLQ pain. Pt states yesterday afternoon she suddenly developed severe RLQ pain with associated nausea and emesis. She states that since onset of symptoms she has been unable to tolerate PO and has had "constant chills". She states she has noticed mild RLQ pain over the last year, however it was transient and notable less severe. Pt has not passed gas in a day but reports normal bowel habits. She denies fever, cp, sob, recent illness, hematochezia, urinary symptoms, unintentional weight loss, cough or night sweats. Colonoscopy at age 40 was WNL.     In ED pt afebrile and Hd stable. Labs remarkable for hyponatremia and WBC of 17. CT a/p has findings compatible with acute appendicitis. Small microperforation cannot be excluded. Also noted multiple enlarged abdominal lymph nodes and additional smaller retroperitoneal lymph nodes suggestive of lymphoma.       Allergies    No Known Allergies    Intolerances    	    MEDICATIONS:  enoxaparin Injectable 40 milliGRAM(s) SubCutaneous every 24 hours    piperacillin/tazobactam IVPB.. 3.375 Gram(s) IV Intermittent every 8 hours      acetaminophen   IVPB .. 1000 milliGRAM(s) IV Intermittent every 6 hours  HYDROmorphone  Injectable 0.25 milliGRAM(s) IV Push every 4 hours PRN  HYDROmorphone  Injectable 0.5 milliGRAM(s) IV Push every 4 hours PRN        influenza   Vaccine 0.5 milliLiter(s) IntraMuscular once  lactated ringers. 1000 milliLiter(s) IV Continuous <Continuous>  sodium chloride 0.9%. 1000 milliLiter(s) IV Continuous <Continuous>      PAST MEDICAL & SURGICAL HISTORY:  Uterine leiomyoma, unspecified location    S/P tonsillectomy  Age 6    H/O colonoscopy  WNL    S/P LEEP of cervix        FAMILY HISTORY:  Hypertension (Father, Mother)  Mother &amp; Father - Alive ages 71 &amp; 73    Family history of stroke (Mother)  Mother - age 71    Family history of type 2 diabetes mellitus in father (Father)  Father - Alive age 73    FH: gastric cancer (Mother)    FH: gastric cancer    FH: breast cancer (Sibling)    FH: breast cancer (Mother)        SOCIAL HISTORY:    no toxic habits. indep in adl    REVIEW OF SYSTEMS:  See HPI, otherwise complete 10 point review of systems negative    [ ] All others negative	    PHYSICAL EXAM:  T(C): 36.8 (02-21-22 @ 03:19), Max: 37.8 (02-21-22 @ 00:00)  HR: 78 (02-21-22 @ 03:19) (72 - 94)  BP: 102/65 (02-21-22 @ 03:19) (102/65 - 155/83)  RR: 18 (02-21-22 @ 03:19) (16 - 20)  SpO2: 94% (02-21-22 @ 03:19) (93% - 97%)  Wt(kg): --  I&O's Summary      Appearance: No Acute Distress	  HEENT:  Normal oral mucosa, PERRL, EOMI	  Cardiovascular: Normal S1 S2, No JVD, No murmurs/rubs/gallops  Respiratory: Lungs clear to auscultation bilaterally  Gastrointestinal:  Soft, Non-tender, + BS	  Skin: No rashes, No ecchymoses, No cyanosis	  Neurologic: Non-focal  Extremities: No clubbing, cyanosis or edema  Vascular: Peripheral pulses palpable 2+ bilaterally  Psychiatry: A & O x 3, Mood & affect appropriate    Laboratory Data:	 	    CBC Full  -  ( 21 Feb 2022 06:56 )  WBC Count : 13.68 K/uL  Hemoglobin : 11.0 g/dL  Hematocrit : 35.3 %  Platelet Count - Automated : 262 K/uL  Mean Cell Volume : 83.3 fl  Mean Cell Hemoglobin : 25.9 pg  Mean Cell Hemoglobin Concentration : 31.2 gm/dL  Auto Neutrophil # : x  Auto Lymphocyte # : x  Auto Monocyte # : x  Auto Eosinophil # : x  Auto Basophil # : x  Auto Neutrophil % : x  Auto Lymphocyte % : x  Auto Monocyte % : x  Auto Eosinophil % : x  Auto Basophil % : x    02-21    137  |  101  |  8   ----------------------------<  98  3.9   |  25  |  0.73  02-20    130<L>  |  96  |  8   ----------------------------<  113<H>  4.0   |  21<L>  |  0.59    Ca    8.8      21 Feb 2022 06:56  Ca    9.6      20 Feb 2022 11:55  Phos  2.9     02-21  Mg     2.1     02-21    TPro  7.1  /  Alb  4.3  /  TBili  0.9  /  DBili  x   /  AST  17  /  ALT  40  /  AlkPhos  72  02-20      proBNP:   Lipid Profile:   HgA1c:   TSH:       CARDIAC MARKERS:            Interpretation of Telemetry: 	    ECG:  	  RADIOLOGY:  OTHER: 	    PREVIOUS DIAGNOSTIC TESTING:    [ ] Echocardiogram:  [ ] Catheterization:  [ ] Stress Test:  	    Assessment:  acute appendicitis  enlarged lymph nodes  cardiac management    Recs:  cardiac stable  iv abx per surgery  suggest heme/onc eval for KYLAH  eventual appendectomy  no further cardiac workup required prior to OR  care per surgery  dvt ppx        Greater than 60 minutes spent on total encounter; more than 50% of the visit was spent counseling and/or coordinating care by the attending physician.   	  Brendan Paredes MD   Cardiovascular Diseases  [phone]     CHIEF COMPLAINT: abd pain    HISTORY OF PRESENT ILLNESS:  51yo F w/ hx of myomectomy presenting with 1 days of RLQ pain. Pt states yesterday afternoon she suddenly developed severe RLQ pain with associated nausea and emesis. She states that since onset of symptoms she has been unable to tolerate PO and has had "constant chills". She states she has noticed mild RLQ pain over the last year, however it was transient and notable less severe. Pt has not passed gas in a day but reports normal bowel habits. She denies fever, cp, sob, recent illness, hematochezia, urinary symptoms, unintentional weight loss, cough or night sweats. Colonoscopy at age 40 was WNL.     In ED pt afebrile and Hd stable. Labs remarkable for hyponatremia and WBC of 17. CT a/p has findings compatible with acute appendicitis. Small microperforation cannot be excluded. Also noted multiple enlarged abdominal lymph nodes and additional smaller retroperitoneal lymph nodes suggestive of lymphoma.       Allergies    No Known Allergies    Intolerances    	    MEDICATIONS:  enoxaparin Injectable 40 milliGRAM(s) SubCutaneous every 24 hours    piperacillin/tazobactam IVPB.. 3.375 Gram(s) IV Intermittent every 8 hours      acetaminophen   IVPB .. 1000 milliGRAM(s) IV Intermittent every 6 hours  HYDROmorphone  Injectable 0.25 milliGRAM(s) IV Push every 4 hours PRN  HYDROmorphone  Injectable 0.5 milliGRAM(s) IV Push every 4 hours PRN        influenza   Vaccine 0.5 milliLiter(s) IntraMuscular once  lactated ringers. 1000 milliLiter(s) IV Continuous <Continuous>  sodium chloride 0.9%. 1000 milliLiter(s) IV Continuous <Continuous>      PAST MEDICAL & SURGICAL HISTORY:  Uterine leiomyoma, unspecified location    S/P tonsillectomy  Age 6    H/O colonoscopy  WNL    S/P LEEP of cervix        FAMILY HISTORY:  Hypertension (Father, Mother)  Mother &amp; Father - Alive ages 71 &amp; 73    Family history of stroke (Mother)  Mother - age 71    Family history of type 2 diabetes mellitus in father (Father)  Father - Alive age 73    FH: gastric cancer (Mother)    FH: gastric cancer    FH: breast cancer (Sibling)    FH: breast cancer (Mother)        SOCIAL HISTORY:    no toxic habits. indep in adl    REVIEW OF SYSTEMS:  See HPI, otherwise complete 10 point review of systems negative    [ ] All others negative	    PHYSICAL EXAM:  T(C): 36.8 (02-21-22 @ 03:19), Max: 37.8 (02-21-22 @ 00:00)  HR: 78 (02-21-22 @ 03:19) (72 - 94)  BP: 102/65 (02-21-22 @ 03:19) (102/65 - 155/83)  RR: 18 (02-21-22 @ 03:19) (16 - 20)  SpO2: 94% (02-21-22 @ 03:19) (93% - 97%)  Wt(kg): --  I&O's Summary      Appearance: No Acute Distress	  HEENT:  Normal oral mucosa, PERRL, EOMI	  Cardiovascular: Normal S1 S2, No JVD, No murmurs/rubs/gallops  Respiratory: Lungs clear to auscultation bilaterally  Gastrointestinal:  Soft, Non-tender, + BS	  Skin: No rashes, No ecchymoses, No cyanosis	  Neurologic: Non-focal  Extremities: No clubbing, cyanosis or edema  Vascular: Peripheral pulses palpable 2+ bilaterally  Psychiatry: A & O x 3, Mood & affect appropriate    Laboratory Data:	 	    CBC Full  -  ( 21 Feb 2022 06:56 )  WBC Count : 13.68 K/uL  Hemoglobin : 11.0 g/dL  Hematocrit : 35.3 %  Platelet Count - Automated : 262 K/uL  Mean Cell Volume : 83.3 fl  Mean Cell Hemoglobin : 25.9 pg  Mean Cell Hemoglobin Concentration : 31.2 gm/dL  Auto Neutrophil # : x  Auto Lymphocyte # : x  Auto Monocyte # : x  Auto Eosinophil # : x  Auto Basophil # : x  Auto Neutrophil % : x  Auto Lymphocyte % : x  Auto Monocyte % : x  Auto Eosinophil % : x  Auto Basophil % : x    02-21    137  |  101  |  8   ----------------------------<  98  3.9   |  25  |  0.73  02-20    130<L>  |  96  |  8   ----------------------------<  113<H>  4.0   |  21<L>  |  0.59    Ca    8.8      21 Feb 2022 06:56  Ca    9.6      20 Feb 2022 11:55  Phos  2.9     02-21  Mg     2.1     02-21    TPro  7.1  /  Alb  4.3  /  TBili  0.9  /  DBili  x   /  AST  17  /  ALT  40  /  AlkPhos  72  02-20      proBNP:   Lipid Profile:   HgA1c:   TSH:       CARDIAC MARKERS:            Interpretation of Telemetry: 	    ECG:  	  RADIOLOGY:  OTHER: 	    PREVIOUS DIAGNOSTIC TESTING:    [ ] Echocardiogram:  [ ] Catheterization:  [ ] Stress Test:  	      Findings compatible with acute appendicitis. There are adjacent   inflammatory changes. Small microperforation cannot be excluded.    Multiple enlarged abdominal lymph nodes including the above-mentioned   mesenteric lymph nodes at the root of the mesentery, largest measuring 4   mm x 3.1 cm. Additional smaller retroperitoneal lymph nodes. Findings are   suggestive of lymphoma. Workup should be performed.    Enlarged bulbous uterus suggests adenomyosis and/or leiomyomatous   disease. Correlation with pelvic ultrasound should be performed. Small   hypodensity left adnexal region also can be evaluated with sonography.    1 cm left lower lobe pulmonary nodule. No previous chest CT examination   for comparison. In view of the findings within the abdomen, recommend   chest CT examination.    Assessment:  acute appendicitis  enlarged lymph nodes  cardiac management  pulmonary nodule    Recs:  cardiac stable  iv abx per surgery  suggest heme/onc eval for KYLAH  eventual appendectomy  no further cardiac workup required prior to OR  care per surgery  non urgent pulmonary evaluation and dedicated CT chest for 1cm LLL nodule  dvt ppx        Greater than 60 minutes spent on total encounter; more than 50% of the visit was spent counseling and/or coordinating care by the attending physician.   	  Brendan Paredes MD   Cardiovascular Diseases  [phone]

## 2022-02-21 NOTE — PROGRESS NOTE ADULT - ASSESSMENT
53yo F w/ hx of myomectomy presenting with 1 days of RLQ pain. CT a/p has findings compatible with acute appendicitis with additional findings of enlarged lymph notes     - Pt with significant inflammatory changes and suggestion of microperforation   - No acute surgical intervention   - IV abx   - Serial abdominal exams   - Pain control     *INCOMPLETE NOTE*    ACS/Trauma Surgery  [id number]  51yo F w/ hx of myomectomy presenting with 1 days of RLQ pain. CT a/p has findings compatible with acute appendicitis with additional findings of enlarged lymph notes     - Pt with significant inflammatory changes and suggestion of microperforation   - No acute surgical intervention at this time, non-operative management   - IV abx   - Serial abdominal exams   - Multimodal Pain control   - OOB/Ambulation as tolerated     ACS/Trauma Surgery  [id number]  53yo F w/ hx of myomectomy presenting with 1 days of RLQ pain. CT a/p has findings compatible with acute appendicitis with additional findings of enlarged lymph notes     - Pt with significant inflammatory changes and suggestion of microperforation   - No acute surgical intervention at this time, non-operative management   - IV abx   - Serial abdominal exams   - Multimodal Pain control   - OOB/Ambulation as tolerated   - Oncology Consult     ACS/Trauma Surgery  [id number]

## 2022-02-21 NOTE — PATIENT PROFILE ADULT - FALL HARM RISK - HARM RISK INTERVENTIONS

## 2022-02-21 NOTE — PROGRESS NOTE ADULT - SUBJECTIVE AND OBJECTIVE BOX
24h Events:  No acute events overnight.    Subjective:   Patient seen at bedside this AM.     Objective:  Vital Signs  T(C): 37.8 (02-21 @ 00:00), Max: 37.8 (02-21 @ 00:00)  HR: 83 (02-21 @ 00:00) (72 - 94)  BP: 110/61 (02-21 @ 00:00) (110/61 - 155/83)  RR: 18 (02-21 @ 00:00) (16 - 20)  SpO2: 93% (02-21 @ 00:00) (93% - 97%)    Physical Exam:  GEN: resting in bed comfortably in NAD  RESP: no increased WOB  ABD: Softly distended. RLQ moderately TTP. No rebound or guarding.   EXTR: warm, well-perfused, no edema  NEURO: awake, alert    Labs:                        12.0   17.02 )-----------( 354      ( 20 Feb 2022 11:55 )             38.4   02-20    130<L>  |  96  |  8   ----------------------------<  113<H>  4.0   |  21<L>  |  0.59    Ca    9.6      20 Feb 2022 11:55    TPro  7.1  /  Alb  4.3  /  TBili  0.9  /  DBili  x   /  AST  17  /  ALT  40  /  AlkPhos  72  02-20    CAPILLARY BLOOD GLUCOSE          Imaging:     24h Events:  No acute events overnight.    Subjective:   Patient seen at bedside this AM. States pain has improved this morning as compared to yesterday.     Objective:  Vital Signs  T(C): 37.8 (02-21 @ 00:00), Max: 37.8 (02-21 @ 00:00)  HR: 83 (02-21 @ 00:00) (72 - 94)  BP: 110/61 (02-21 @ 00:00) (110/61 - 155/83)  RR: 18 (02-21 @ 00:00) (16 - 20)  SpO2: 93% (02-21 @ 00:00) (93% - 97%)    Physical Exam:  GEN: resting in bed comfortably in NAD  RESP: no increased WOB  ABD: Softly distended. RLQ moderately TTP. No rebound or guarding.   EXTR: warm, well-perfused, no edema  NEURO: awake, alert    Labs:                        12.0   17.02 )-----------( 354      ( 20 Feb 2022 11:55 )             38.4   02-20    130<L>  |  96  |  8   ----------------------------<  113<H>  4.0   |  21<L>  |  0.59    Ca    9.6      20 Feb 2022 11:55    TPro  7.1  /  Alb  4.3  /  TBili  0.9  /  DBili  x   /  AST  17  /  ALT  40  /  AlkPhos  72  02-20    CAPILLARY BLOOD GLUCOSE          Imaging:

## 2022-02-22 ENCOUNTER — TRANSCRIPTION ENCOUNTER (OUTPATIENT)
Age: 52
End: 2022-02-22

## 2022-02-22 VITALS
SYSTOLIC BLOOD PRESSURE: 106 MMHG | HEART RATE: 79 BPM | RESPIRATION RATE: 18 BRPM | OXYGEN SATURATION: 94 % | DIASTOLIC BLOOD PRESSURE: 52 MMHG | TEMPERATURE: 99 F

## 2022-02-22 LAB
ALBUMIN SERPL ELPH-MCNC: 3.5 G/DL — SIGNIFICANT CHANGE UP (ref 3.3–5)
ALP SERPL-CCNC: 69 U/L — SIGNIFICANT CHANGE UP (ref 40–120)
ALT FLD-CCNC: 21 U/L — SIGNIFICANT CHANGE UP (ref 10–45)
ANION GAP SERPL CALC-SCNC: 11 MMOL/L — SIGNIFICANT CHANGE UP (ref 5–17)
AST SERPL-CCNC: 12 U/L — SIGNIFICANT CHANGE UP (ref 10–40)
BILIRUB DIRECT SERPL-MCNC: 0.1 MG/DL — SIGNIFICANT CHANGE UP (ref 0–0.3)
BILIRUB INDIRECT FLD-MCNC: 0.4 MG/DL — SIGNIFICANT CHANGE UP (ref 0.2–1)
BILIRUB SERPL-MCNC: 0.5 MG/DL — SIGNIFICANT CHANGE UP (ref 0.2–1.2)
BUN SERPL-MCNC: 7 MG/DL — SIGNIFICANT CHANGE UP (ref 7–23)
CALCIUM SERPL-MCNC: 8.7 MG/DL — SIGNIFICANT CHANGE UP (ref 8.4–10.5)
CHLORIDE SERPL-SCNC: 100 MMOL/L — SIGNIFICANT CHANGE UP (ref 96–108)
CO2 SERPL-SCNC: 25 MMOL/L — SIGNIFICANT CHANGE UP (ref 22–31)
CREAT SERPL-MCNC: 0.79 MG/DL — SIGNIFICANT CHANGE UP (ref 0.5–1.3)
GLUCOSE SERPL-MCNC: 80 MG/DL — SIGNIFICANT CHANGE UP (ref 70–99)
HCT VFR BLD CALC: 33.3 % — LOW (ref 34.5–45)
HGB BLD-MCNC: 10.3 G/DL — LOW (ref 11.5–15.5)
LDH SERPL L TO P-CCNC: 334 U/L — HIGH (ref 50–242)
MAGNESIUM SERPL-MCNC: 2.1 MG/DL — SIGNIFICANT CHANGE UP (ref 1.6–2.6)
MCHC RBC-ENTMCNC: 25.6 PG — LOW (ref 27–34)
MCHC RBC-ENTMCNC: 30.9 GM/DL — LOW (ref 32–36)
MCV RBC AUTO: 82.8 FL — SIGNIFICANT CHANGE UP (ref 80–100)
NRBC # BLD: 0 /100 WBCS — SIGNIFICANT CHANGE UP (ref 0–0)
PHOSPHATE SERPL-MCNC: 3.1 MG/DL — SIGNIFICANT CHANGE UP (ref 2.5–4.5)
PLATELET # BLD AUTO: 280 K/UL — SIGNIFICANT CHANGE UP (ref 150–400)
POTASSIUM SERPL-MCNC: 3.6 MMOL/L — SIGNIFICANT CHANGE UP (ref 3.5–5.3)
POTASSIUM SERPL-SCNC: 3.6 MMOL/L — SIGNIFICANT CHANGE UP (ref 3.5–5.3)
PROT SERPL-MCNC: 6 G/DL — SIGNIFICANT CHANGE UP (ref 6–8.3)
RBC # BLD: 4.02 M/UL — SIGNIFICANT CHANGE UP (ref 3.8–5.2)
RBC # FLD: 14.8 % — HIGH (ref 10.3–14.5)
SODIUM SERPL-SCNC: 136 MMOL/L — SIGNIFICANT CHANGE UP (ref 135–145)
URATE SERPL-MCNC: 2.7 MG/DL — SIGNIFICANT CHANGE UP (ref 2.5–7)
WBC # BLD: 10.52 K/UL — HIGH (ref 3.8–10.5)
WBC # FLD AUTO: 10.52 K/UL — HIGH (ref 3.8–10.5)

## 2022-02-22 PROCEDURE — 81001 URINALYSIS AUTO W/SCOPE: CPT

## 2022-02-22 PROCEDURE — 87086 URINE CULTURE/COLONY COUNT: CPT

## 2022-02-22 PROCEDURE — 93005 ELECTROCARDIOGRAM TRACING: CPT

## 2022-02-22 PROCEDURE — 85610 PROTHROMBIN TIME: CPT

## 2022-02-22 PROCEDURE — 80053 COMPREHEN METABOLIC PANEL: CPT

## 2022-02-22 PROCEDURE — 83690 ASSAY OF LIPASE: CPT

## 2022-02-22 PROCEDURE — U0005: CPT

## 2022-02-22 PROCEDURE — 71250 CT THORAX DX C-: CPT | Mod: 26

## 2022-02-22 PROCEDURE — 83615 LACTATE (LD) (LDH) ENZYME: CPT

## 2022-02-22 PROCEDURE — 99223 1ST HOSP IP/OBS HIGH 75: CPT

## 2022-02-22 PROCEDURE — 86850 RBC ANTIBODY SCREEN: CPT

## 2022-02-22 PROCEDURE — U0003: CPT

## 2022-02-22 PROCEDURE — 85027 COMPLETE CBC AUTOMATED: CPT

## 2022-02-22 PROCEDURE — 84100 ASSAY OF PHOSPHORUS: CPT

## 2022-02-22 PROCEDURE — 87389 HIV-1 AG W/HIV-1&-2 AB AG IA: CPT

## 2022-02-22 PROCEDURE — 36415 COLL VENOUS BLD VENIPUNCTURE: CPT

## 2022-02-22 PROCEDURE — 86901 BLOOD TYPING SEROLOGIC RH(D): CPT

## 2022-02-22 PROCEDURE — 84550 ASSAY OF BLOOD/URIC ACID: CPT

## 2022-02-22 PROCEDURE — 96375 TX/PRO/DX INJ NEW DRUG ADDON: CPT

## 2022-02-22 PROCEDURE — 80048 BASIC METABOLIC PNL TOTAL CA: CPT

## 2022-02-22 PROCEDURE — 74177 CT ABD & PELVIS W/CONTRAST: CPT | Mod: MD

## 2022-02-22 PROCEDURE — 80076 HEPATIC FUNCTION PANEL: CPT

## 2022-02-22 PROCEDURE — 99285 EMERGENCY DEPT VISIT HI MDM: CPT

## 2022-02-22 PROCEDURE — 86900 BLOOD TYPING SEROLOGIC ABO: CPT

## 2022-02-22 PROCEDURE — 85730 THROMBOPLASTIN TIME PARTIAL: CPT

## 2022-02-22 PROCEDURE — 99233 SBSQ HOSP IP/OBS HIGH 50: CPT

## 2022-02-22 PROCEDURE — 84702 CHORIONIC GONADOTROPIN TEST: CPT

## 2022-02-22 PROCEDURE — 85025 COMPLETE CBC W/AUTO DIFF WBC: CPT

## 2022-02-22 PROCEDURE — 83735 ASSAY OF MAGNESIUM: CPT

## 2022-02-22 PROCEDURE — 96374 THER/PROPH/DIAG INJ IV PUSH: CPT | Mod: XU

## 2022-02-22 PROCEDURE — 71250 CT THORAX DX C-: CPT

## 2022-02-22 RX ORDER — ACETAMINOPHEN 500 MG
1000 TABLET ORAL EVERY 6 HOURS
Refills: 0 | Status: DISCONTINUED | OUTPATIENT
Start: 2022-02-22 | End: 2022-02-22

## 2022-02-22 RX ORDER — POTASSIUM CHLORIDE 20 MEQ
40 PACKET (EA) ORAL ONCE
Refills: 0 | Status: COMPLETED | OUTPATIENT
Start: 2022-02-22 | End: 2022-02-22

## 2022-02-22 RX ORDER — OXYCODONE HYDROCHLORIDE 5 MG/1
1 TABLET ORAL
Qty: 6 | Refills: 0
Start: 2022-02-22

## 2022-02-22 RX ADMIN — Medication 1000 MILLIGRAM(S): at 04:05

## 2022-02-22 RX ADMIN — PIPERACILLIN AND TAZOBACTAM 25 GRAM(S): 4; .5 INJECTION, POWDER, LYOPHILIZED, FOR SOLUTION INTRAVENOUS at 08:51

## 2022-02-22 RX ADMIN — PIPERACILLIN AND TAZOBACTAM 25 GRAM(S): 4; .5 INJECTION, POWDER, LYOPHILIZED, FOR SOLUTION INTRAVENOUS at 00:44

## 2022-02-22 RX ADMIN — Medication 40 MILLIEQUIVALENT(S): at 08:50

## 2022-02-22 RX ADMIN — Medication 400 MILLIGRAM(S): at 08:51

## 2022-02-22 RX ADMIN — OXYCODONE HYDROCHLORIDE 5 MILLIGRAM(S): 5 TABLET ORAL at 01:33

## 2022-02-22 RX ADMIN — Medication 400 MILLIGRAM(S): at 03:34

## 2022-02-22 RX ADMIN — OXYCODONE HYDROCHLORIDE 5 MILLIGRAM(S): 5 TABLET ORAL at 01:02

## 2022-02-22 NOTE — PROGRESS NOTE ADULT - SUBJECTIVE AND OBJECTIVE BOX
Cardiovascular Disease Progress Note    Overnight events: No acute events overnight.  no cp/sob/palps/dizziness  Otherwise review of systems negative    Objective Findings:  T(C): 37 (02-22-22 @ 05:16), Max: 37.4 (02-22-22 @ 00:09)  HR: 72 (02-22-22 @ 05:16) (72 - 89)  BP: 92/51 (02-22-22 @ 05:16) (92/51 - 114/72)  RR: 18 (02-22-22 @ 05:16) (18 - 18)  SpO2: 92% (02-22-22 @ 05:16) (92% - 97%)  Wt(kg): --  Daily     Daily       Physical Exam:  Gen: NAD  HEENT: EOMI  CV: RRR, normal S1 + S2, no m/r/g  Lungs: CTAB  Abd: soft, non-tender  Ext: No edema    Telemetry:    Laboratory Data:                        10.3   10.52 )-----------( 280      ( 22 Feb 2022 06:25 )             33.3     02-22    136  |  100  |  7   ----------------------------<  80  3.6   |  25  |  0.79    Ca    8.7      22 Feb 2022 06:24  Phos  3.1     02-22  Mg     2.1     02-22    TPro  6.0  /  Alb  3.5  /  TBili  0.5  /  DBili  0.1  /  AST  12  /  ALT  21  /  AlkPhos  69  02-22    PT/INR - ( 20 Feb 2022 17:44 )   PT: 13.6 sec;   INR: 1.14 ratio         PTT - ( 20 Feb 2022 17:44 )  PTT:29.1 sec          Inpatient Medications:  MEDICATIONS  (STANDING):  acetaminophen   IVPB .. 1000 milliGRAM(s) IV Intermittent every 6 hours  enoxaparin Injectable 40 milliGRAM(s) SubCutaneous every 24 hours  influenza   Vaccine 0.5 milliLiter(s) IntraMuscular once  piperacillin/tazobactam IVPB.. 3.375 Gram(s) IV Intermittent every 8 hours  potassium chloride    Tablet ER 40 milliEquivalent(s) Oral once      Assessment:  acute appendicitis  enlarged lymph nodes  cardiac management  pulmonary nodule    Recs:  cardiac stable  iv abx per surgery  suggest heme/onc eval for KYLAH vs intraop LN sampling  eventual appendectomy  no further cardiac workup required prior to OR  care per surgery  non urgent pulmonary evaluation and dedicated CT chest for 1cm LLL nodule  dvt ppx          Over 25 minutes spent on total encounter; more than 50% of the visit was spent counseling and/or coordinating care by the attending physician.      Brendan Paredes MD   Cardiovascular Disease  [phone]

## 2022-02-22 NOTE — CONSULT NOTE ADULT - ATTENDING COMMENTS
51 yo F with a PMH of myomectomy presenting with 1 days of RLQ pain, found to have appendicitis and lymphadenopathy, cannot rule out lymphoma.    #Lymphadenopathy  - Patient who presented with RLQ pain, found to have appendicitis but also has lymphadenopathy on CT A/P as follows:  - 1 cm pulmonary nodule in the left lower lobe. Tiny right cardiophrenic lymph nodes. Subcentimeter hypodensity centrally right lobe of the liver. Multiple enlarged abdominal lymph nodes, including multiple mesenteric lymph nodes at the root of the mesentery including largest a 4 cm x 3.1 cm node and a 3.6 cm x 2 cm lymph node and a 2.4 cm x 2.2 cm lymph node, and a 2.2 cm x 1.4 cm lymph node; also multiple small periaortic and pericaval lymph nodes, largest measuring 1.9 cm x 1.5 cm.  Patient will need a PET/CT to guide the best site for biopsy to r/o lymphoma. This work up can be done as outpatient, patient is asymptomatic.   - Will set up an appointment for patient to follow up at [facility] (likely with Dr. Davila) in 1-2 weeks. Discussed with surgical team; pt likely to follow up with them and may consider appendectomy outpatient. If so, may be able to set up a lymph node biopsy concurrently with surgery, to be further determined as an outpatient

## 2022-02-22 NOTE — PROGRESS NOTE ADULT - ATTENDING COMMENTS
Pt seen and examined with ACS team, agree with above. Pt feeling better, less pain than on presentation. Tolerated clears.    1. Uncomplicated appendicitis:  - Continue non-operative management. No appendicolith on CT, so no need for interval appendectomy unless patient has recurrent or prolonged symptoms.  - Will need outpatient colonoscopy in 6-8 weeks to ensure appendicitis was not caused by mass  - Advance to [facility]    2. Lymphadenopathy and pulmonary nodule:  - Heme-Onc consultation appreciated  - CT chest done, pulmonary nodule again noted, will need repeat CT chest in 3 months  - PET/CT as outpatient    - Lymph node sampling in retroperitoneum can be done by Surg Onc as outpatient, will provide patient with this information.

## 2022-02-22 NOTE — DISCHARGE NOTE PROVIDER - CARE PROVIDERS DIRECT ADDRESSES
,[email or web address]

## 2022-02-22 NOTE — DISCHARGE NOTE PROVIDER - NSDCFUADDAPPT_GEN_ALL_CORE_FT
Gastroenterology at [facility]	  Gastroenterology	  [address] [facility] [id number] [phone]				    [facility]	  Hematology/Oncology	  [address] [facility] [id number] [phone]				     Gastroenterology at [facility]  [address] [facility] [id number] [phone]		    [facility]	Hematology/Oncology	  [address] [facility] [id number] [phone]				  Please follow up with your Surgery Doctor only if needed at [address] [facility] [address] [place] [address]; Phone #[phone]. (If you have a Right Lower quadrant pain or doesn’t completely subside).

## 2022-02-22 NOTE — DISCHARGE NOTE PROVIDER - PROVIDER TOKENS
PROVIDER:[TOKEN:[[id number]],FOLLOWUP:[1 week]] PROVIDER:[TOKEN:[[id number]],FOLLOWUP:[1 week]],PROVIDER:[TOKEN:[[id number]]] PROVIDER:[TOKEN:[[id number]]],PROVIDER:[TOKEN:[[id number]],FOLLOWUP:[2 weeks]]

## 2022-02-22 NOTE — DISCHARGE NOTE PROVIDER - NSDCCPCAREPLAN_GEN_ALL_CORE_FT
PRINCIPAL DISCHARGE DIAGNOSIS  Diagnosis: Appendicitis  Assessment and Plan of Treatment: You were admitted for an appendicitis.  You were placed on IV antibiotics.  You will be discharged on oral antibiotics.  Please complete as prescribed.   ACTIVITY: No heavy lifting anything more than 10-15lbs or straining. Otherwise, you may return to your usual level of physical activity. If you are taking narcotic pain medication (such as oxycodone), do NOT drive a car, operate machinery or make important decisions.  DIET: Low fiber diet  NOTIFY YOUR SURGEON IF: You have fever (over 100.4 F) or chills, persistent nausea/vomiting with inability to tolerate food or liquids, persistent diarrhea, or if your pain is not controlled on your discharge pain medications.  FOLLOW-UP:  1. Please call to make a follow-up appointment within one week of discharge.       PRINCIPAL DISCHARGE DIAGNOSIS  Diagnosis: Appendicitis  Assessment and Plan of Treatment: You were admitted for an appendicitis.  You were placed on IV antibiotics.  You will be discharged on oral antibiotics.  Please complete as prescribed.   ACTIVITY: No heavy lifting anything more than 10-15lbs or straining. Otherwise, you may return to your usual level of physical activity. If you are taking narcotic pain medication (such as oxycodone), do NOT drive a car, operate machinery or make important decisions.  DIET: Low fiber diet  NOTIFY YOUR SURGEON IF: You have fever (over 100.4 F) or chills, persistent nausea/vomiting with inability to tolerate food or liquids, persistent diarrhea, or if your pain is not controlled on your discharge pain medications.  FOLLOW-UP:  1. Please call to make a follow-up appointment within one week of discharge.  2.  You will need a colonoscopy in 6-8 weeks.  You may do so in the GI clinic or may follow up with your private GI doctor.      SECONDARY DISCHARGE DIAGNOSES  Diagnosis: Lymphadenopathy  Assessment and Plan of Treatment: Hematology/oncology was consulted for lymphadenopathy seen on CT abdomen/pelvis.   They recommended *****  Please follow up outpatient.     PRINCIPAL DISCHARGE DIAGNOSIS  Diagnosis: Appendicitis  Assessment and Plan of Treatment: You were admitted for an appendicitis.  You were placed on IV antibiotics.  You will be discharged on oral antibiotics.  Please complete as prescribed.   ACTIVITY: No heavy lifting anything more than 10-15lbs or straining. Otherwise, you may return to your usual level of physical activity. If you are taking narcotic pain medication (such as oxycodone), do NOT drive a car, operate machinery or make important decisions.  DIET: Low fiber diet  NOTIFY YOUR SURGEON IF: You have fever (over 100.4 F) or chills, persistent nausea/vomiting with inability to tolerate food or liquids, persistent diarrhea, or if your pain is not controlled on your discharge pain medications.  FOLLOW-UP:  1. Please call to make a follow-up appointment within one week of discharge.  2.  You will need a colonoscopy in 6-8 weeks.  You may do so in the GI clinic or may follow up with your private GI doctor.      SECONDARY DISCHARGE DIAGNOSES  Diagnosis: Lymphadenopathy  Assessment and Plan of Treatment: Hematology/oncology was consulted for lymphadenopathy seen on CT abdomen/pelvis.   They recommended outpatient PET scan and lymph node biopsy.  If patient getting interval appendectomy, lymph biopsy may be performed at that time.     Please follow up outpatient.

## 2022-02-22 NOTE — DISCHARGE NOTE PROVIDER - CARE PROVIDER_API CALL
Lang Pérez (MD)  Surgery; Surgical Critical Care  [address] [address]  Phone: [phone]  Fax: [phone]  Follow Up Time: 1 week   Lang Pérez)  Surgery; Surgical Critical Care  [address] [place] [address]  Phone: [phone]  Fax: [phone]  Follow Up Time: 1 week    Mony Davila; MSc)  Internal Medicine; Medical Oncology  [address] [facility], [place] [address]  Phone: [phone]  Fax: [phone]  Follow Up Time:    Mony Davila; MSc)  Internal Medicine; Medical Oncology  [address] [place] [address]  Phone: [phone]  Fax: [phone]  Follow Up Time:     Kaden Frias)  Complex General Surgical Oncology; Surgery; Surgical Oncology  [address] [place] [address]  Phone: [phone]  Fax: [phone]  Follow Up Time: 2 weeks

## 2022-02-22 NOTE — CONSULT NOTE ADULT - ASSESSMENT
53 yo F with a PMH of myomectomy presenting with 1 days of RLQ pain, found to have appendicitis and lymphadenopathy, cannot rule out lymphoma.    #Lymphadenopathy  - Patient who presented with RLQ pain, found to have appendicitis but also has lymphadenopathy on CT A/P as follows:  - 1 cm pulmonary nodule in the left lower lobe. Tiny right cardiophrenic lymph nodes. Subcentimeter hypodensity centrally right lobe of the liver. Multiple enlarged abdominal lymph nodes, including multiple mesenteric lymph nodes at the root of the mesentery including largest a 4 cm x 3.1 cm node and a 3.6 cm x 2 cm lymph node and a 2.4 cm x 2.2 cm lymph node, and a 2.2 cm x 1.4 cm lymph node; also multiple small periaortic and pericaval lymph nodes, largest measuring 1.9 cm x 1.5 cm  - At this point, cannot rule out lymphoma, having CT chest performed but does not need any other acute workup inpatient  - Will set up an appointment for patient to follow up at [facility] (likely with Dr. Davila) in 1-2 weeks. Discussed with surgical team; pt likely to follow up with them and may consider appendectomy outpatient. If so, may be able to set up a lymph node biopsy concurrently with surgery, to be further determined as an outpatient  - Would also recommend PET/CT to be performed outpatient  - No contraindications to discharge from a Hematology standpoint if patient is not having surgery inpatient      Aryles Hedjar, MD, PGY-4  Hematology/Oncology Fellow  [facility]  Pager: [phone]  After 5PM and on weekends and holidays, please call the inpatient fellow on call.

## 2022-02-22 NOTE — PROGRESS NOTE ADULT - ASSESSMENT
53yo F w/ hx of myomectomy presenting with 1 days of RLQ pain. CT a/p has findings compatible with acute appendicitis with additional findings of enlarged lymph notes     - Pt with significant inflammatory changes and suggestion of microperforation   - No acute surgical intervention at this time, non-operative management   - IV abx   - Serial abdominal exams   - Multimodal Pain control   - OOB/Ambulation as tolerated   - Oncology Consult     *INCOMPLETE NOTE*  ACS/Trauma Surgery  [id number]  51yo F w/ hx of myomectomy presenting with 1 days of RLQ pain. CT a/p has findings compatible with acute appendicitis with additional findings of enlarged lymph notes     - No acute surgical intervention at this time, non-operative management   - IV abx   - Serial abdominal exams   - Multimodal Pain control   - OOB/Ambulation as tolerated   - f/u with heme outpatient for petscan     ACS/Trauma Surgery  [id number]  51yo F w/ hx of myomectomy presenting with 1 days of RLQ pain. CT a/p has findings compatible with acute appendicitis with additional findings of enlarged lymph notes     - No acute surgical intervention at this time, non-operative management   - IV abx  - CT Chest and outpatient workup per Heme/Onc, appreciate recs   - Serial abdominal exams   - Multimodal Pain control   - OOB/Ambulation as tolerated   -Likely discharge today      ACS/Trauma Surgery  [id number]  53yo F w/ hx of myomectomy presenting with 1 days of RLQ pain. CT a/p has findings compatible with acute appendicitis with additional findings of enlarged lymph notes     - No acute surgical intervention at this time, non-operative management   - IV abx  - CT Chest and outpatient workup per Heme/Onc, appreciate recs   - Serial abdominal exams   - Multimodal Pain control   - OOB/Ambulation as tolerated   - Likely discharge today      ACS/Trauma Surgery  [id number]

## 2022-02-22 NOTE — DISCHARGE NOTE PROVIDER - NSDCMRMEDTOKEN_GEN_ALL_CORE_FT
amoxicillin-clavulanate 875 mg-125 mg oral tablet: 1 milligram(s) orally 2 times a day   oxyCODONE 5 mg oral tablet: 1 tab(s) orally every 4 hours, As needed, Severe Pain (7 - 10) MDD:4

## 2022-02-22 NOTE — PROGRESS NOTE ADULT - SUBJECTIVE AND OBJECTIVE BOX
24h Events:  No acute events overnight.    Subjective:   Patient seen at bedside this AM.     Objective:  Vital Signs  T(C): 37.4 (02-22 @ 00:09), Max: 37.4 (02-22 @ 00:09)  HR: 89 (02-22 @ 00:09) (78 - 89)  BP: 113/65 (02-22 @ 00:09) (102/65 - 114/72)  RR: 18 (02-22 @ 00:09) (18 - 18)  SpO2: 93% (02-22 @ 00:09) (93% - 97%)      Physical Exam:  GEN: resting in bed comfortably in NAD  RESP: no increased WOB  ABD: Softly distended. RLQ moderately TTP. No rebound or guarding.   EXTR: warm, well-perfused, no edema  NEURO: awake, alert    Labs:                        11.0   13.68 )-----------( 262      ( 21 Feb 2022 06:56 )             35.3   02-21    137  |  101  |  8   ----------------------------<  98  3.9   |  25  |  0.73    Ca    8.8      21 Feb 2022 06:56  Phos  2.9     02-21  Mg     2.1     02-21    TPro  7.1  /  Alb  4.3  /  TBili  0.9  /  DBili  x   /  AST  17  /  ALT  40  /  AlkPhos  72  02-20    CAPILLARY BLOOD GLUCOSE          Imaging:       Subjective:     Patient seen at bedside this AM. Endorses she feels well, ate overnight and tolerated with mild RLQ pain.     Objective:  Vital Signs  T(C): 37.4 (02-22 @ 00:09), Max: 37.4 (02-22 @ 00:09)  HR: 89 (02-22 @ 00:09) (78 - 89)  BP: 113/65 (02-22 @ 00:09) (102/65 - 114/72)  RR: 18 (02-22 @ 00:09) (18 - 18)  SpO2: 93% (02-22 @ 00:09) (93% - 97%)      Physical Exam:  GEN: resting in bed comfortably in NAD  RESP: no increased WOB  ABD: Softly distended. RLQ moderately TTP. No rebound or guarding.   EXTR: warm, well-perfused, no edema  NEURO: awake, alert    Labs:                        11.0   13.68 )-----------( 262      ( 21 Feb 2022 06:56 )             35.3   02-21    137  |  101  |  8   ----------------------------<  98  3.9   |  25  |  0.73    Ca    8.8      21 Feb 2022 06:56  Phos  2.9     02-21  Mg     2.1     02-21    TPro  7.1  /  Alb  4.3  /  TBili  0.9  /  DBili  x   /  AST  17  /  ALT  40  /  AlkPhos  72  02-20    CAPILLARY BLOOD GLUCOSE          Imaging:

## 2022-02-22 NOTE — DISCHARGE NOTE PROVIDER - NSDCFUADDINST_GEN_ALL_CORE_FT
- Please set up an appointment at [facility] (with Dr. Davila) in 1-2 weeks. Discuss.  - Recommend PET/CT to be performed outpatient.

## 2022-02-22 NOTE — CONSULT NOTE ADULT - SUBJECTIVE AND OBJECTIVE BOX
51 yo F with a PMH of myomectomy presenting with 1 days of RLQ pain. Pt states the afternoon of the day prior to admission she suddenly developed severe RLQ pain with associated nausea and emesis. She states that since onset of symptoms she has been unable to tolerate PO and has had "constant chills". She states she has noticed mild RLQ pain over the last year, however it was transient and notable less severe. Pt has not passed gas in a day but reports normal bowel habits. She denies fever, cp, sob, recent illness, hematochezia, urinary symptoms, unintentional weight loss, cough or night sweats. Colonoscopy at age 40 was WNL.     Hematology was consulted due to imaging concerning for lymphadenopathy and could not r/o lymphoma.      Allergies  No Known Allergies      MEDICATIONS  (STANDING):  acetaminophen   IVPB .. 1000 milliGRAM(s) IV Intermittent every 6 hours  enoxaparin Injectable 40 milliGRAM(s) SubCutaneous every 24 hours  influenza   Vaccine 0.5 milliLiter(s) IntraMuscular once  piperacillin/tazobactam IVPB.. 3.375 Gram(s) IV Intermittent every 8 hours    MEDICATIONS  (PRN):  oxyCODONE    IR 5 milliGRAM(s) Oral every 4 hours PRN Severe Pain (7 - 10)      PAST MEDICAL & SURGICAL HISTORY:  Uterine leiomyoma, unspecified location    S/P tonsillectomy  Age 6    H/O colonoscopy  WNL    S/P LEEP of cervix        FAMILY HISTORY:  Hypertension (Father, Mother)  Mother &amp; Father - Alive ages 71 &amp; 73    Family history of stroke (Mother)  Mother - age 71    Family history of type 2 diabetes mellitus in father (Father)  Father - Alive age 73    FH: gastric cancer (Mother)    FH: gastric cancer    FH: breast cancer (Sibling)    FH: breast cancer (Mother)        SOCIAL HISTORY: No current alcohol, tobacco, or drug use    REVIEW OF SYSTEMS:  CONSTITUTIONAL: + chills. No fever  EYES/ENT: No visual changes; no throat pain   NECK: No pain or stiffness  RESPIRATORY: no SOB or cough  CARDIOVASCULAR: No chest pain or palpitations  GASTROINTESTINAL: + abdominal pain/N/V. No D/C  GENITOURINARY: No dysuria, change in frequency, or hematuria  NEUROLOGICAL: No numbness or focal weakness  SKIN: No itching, burning, rashes, or lesions   Psych: No depression   MSK: no joint pain  Allergy: no urticaria         T(F): 97.8 (02-22-22 @ 12:33), Max: 99.4 (02-22-22 @ 00:09)  HR: 76 (02-22-22 @ 12:33)  BP: 111/61 (02-22-22 @ 12:33)  RR: 18 (02-22-22 @ 12:33)  SpO2: 97% (02-22-22 @ 12:33)  Wt(kg): --    GENERAL: NAD  HEENT: EOMI, MMM, no oropharyngeal lesions or erythema appreciated  Pulm: no increased WOB, CTAB/L  CV: RRR, S1, S2, no m/g/r  ABDOMEN: + RLQ TTP. Abdomen soft, ND, no masses felt, no HSM  MSK: nl ROM  EXTREMITIES: no appreciable edema in b/l LE  LYMPH: no anterior cervical, posterior cervical, supraclavicular, or inguinal lymphadenopathy  Neuro: A&Ox3, no focal deficits  SKIN: warm and dry, no visible rash                          10.3   10.52 )-----------( 280      ( 22 Feb 2022 06:25 )             33.3       02-22    136  |  100  |  7   ----------------------------<  80  3.6   |  25  |  0.79    Ca    8.7      22 Feb 2022 06:24  Phos  3.1     02-22  Mg     2.1     02-22    TPro  6.0  /  Alb  3.5  /  TBili  0.5  /  DBili  0.1  /  AST  12  /  ALT  21  /  AlkPhos  69  02-22      Magnesium, Serum: 2.1 mg/dL (02-22 @ 06:24)  Phosphorus Level, Serum: 3.1 mg/dL (02-22 @ 06:24)  Lactate Dehydrogenase, Serum: 334 U/L (02-22 @ 06:24)  Uric Acid, Serum: 2.7 mg/dL (02-22 @ 06:24)

## 2022-02-22 NOTE — DISCHARGE NOTE PROVIDER - HOSPITAL COURSE
53yo F w/ hx of myomectomy presenting with 1 days of RLQ pain. Pt states yesterday afternoon she suddenly developed severe RLQ pain with associated nausea and emesis. She states that since onset of symptoms she has been unable to tolerate PO and has had "constant chills". She states she has noticed mild RLQ pain over the last year, however it was transient and notable less severe. Pt has not passed gas in a day but reports normal bowel habits. She denies fever, cp, sob, recent illness, hematochezia, urinary symptoms, unintentional weight loss, cough or night sweats. Colonoscopy at age 40 was WNL.     In ED pt afebrile and Hd stable. Labs remarkable for hyponatremia and WBC of 17. CT a/p has findings compatible with acute appendicitis. Small microperforation cannot be excluded. Also noted multiple enlarged abdominal lymph nodes and additional smaller retroperitoneal lymph nodes suggestive of lymphoma.     Patient was admitted to ACS for nonoperative management.  Patient was placed on IV antibiotics.  Serial abdominal exams were performed and pain was controlled appropriately.      On day of discharge, patient was tolerating regular diet, ambulating, and pain was controlled.   53yo F w/ hx of myomectomy presenting with 1 days of RLQ pain. Pt states yesterday afternoon she suddenly developed severe RLQ pain with associated nausea and emesis. She states that since onset of symptoms she has been unable to tolerate PO and has had "constant chills". She states she has noticed mild RLQ pain over the last year, however it was transient and notable less severe. Pt has not passed gas in a day but reports normal bowel habits. She denies fever, cp, sob, recent illness, hematochezia, urinary symptoms, unintentional weight loss, cough or night sweats. Colonoscopy at age 40 was WNL.     In ED pt afebrile and Hd stable. Labs remarkable for hyponatremia and WBC of 17. CT a/p has findings compatible with acute appendicitis. Small microperforation cannot be excluded. Also noted multiple enlarged abdominal lymph nodes and additional smaller retroperitoneal lymph nodes suggestive of lymphoma.     Patient was admitted to ACS for nonoperative management.  Patient was placed on IV antibiotics.  Serial abdominal exams were performed and pain was controlled appropriately.  Hematology/oncology was consulted for lymphadenopathy suggestive of lymphoma.  Hematology/oncology recommended ***    On day of discharge, patient was tolerating regular diet, ambulating, and pain was controlled.     She will be discharged on augmentin x 10 days.   She will need an outpatient colonoscopy in 6-8 weeks.   Patient to follow up outpatient with hem/onc regarding lymphadenopathy.      51yo F w/ hx of myomectomy presenting with 1 days of RLQ pain. Pt states yesterday afternoon she suddenly developed severe RLQ pain with associated nausea and emesis. She states that since onset of symptoms she has been unable to tolerate PO and has had "constant chills". She states she has noticed mild RLQ pain over the last year, however it was transient and notable less severe. Pt has not passed gas in a day but reports normal bowel habits. She denies fever, cp, sob, recent illness, hematochezia, urinary symptoms, unintentional weight loss, cough or night sweats. Colonoscopy at age 40 was WNL.     In ED pt afebrile and Hd stable. Labs remarkable for hyponatremia and WBC of 17. CT a/p has findings compatible with acute appendicitis. Small microperforation cannot be excluded. Also noted multiple enlarged abdominal lymph nodes and additional smaller retroperitoneal lymph nodes suggestive of lymphoma.     Patient was admitted to ACS for nonoperative management.  Patient was placed on IV antibiotics.  Serial abdominal exams were performed and pain was controlled appropriately.  Hematology/oncology was consulted for lymphadenopathy suggestive of lymphoma.  Hematology/oncology recommended outpatient PET scan and lymph node biopsy.  If patient is to receive interval appendectomy, lymph biopsy may be performed at that time.       On day of discharge, patient was tolerating regular diet, ambulating, and pain was controlled.     She will be discharged on augmentin for a total of 10 days.   She will need an outpatient colonoscopy in 6-8 weeks.   Patient to follow up outpatient with hem/onc regarding lymphadenopathy. 51yo F w/ hx of myomectomy presenting with 1 days of RLQ pain. Pt states yesterday afternoon she suddenly developed severe RLQ pain with associated nausea and emesis. She states that since onset of symptoms she has been unable to tolerate PO and has had "constant chills". She states she has noticed mild RLQ pain over the last year, however it was transient and notable less severe. Pt has not passed gas in a day but reports normal bowel habits. She denies fever, cp, sob, recent illness, hematochezia, urinary symptoms, unintentional weight loss, cough or night sweats. Colonoscopy at age 40 was WNL.     In ED pt afebrile and Hd stable. Labs remarkable for hyponatremia and WBC of 17. CT a/p has findings compatible with acute appendicitis. Small microperforation cannot be excluded. Also noted multiple enlarged abdominal lymph nodes and additional smaller retroperitoneal lymph nodes suggestive of lymphoma.     Patient was admitted to [facility] for nonoperative management.  Patient was placed on IV antibiotics.  Serial abdominal exams were performed and pain was controlled appropriately.  Hematology/oncology was consulted for lymphadenopathy suggestive of lymphoma.  Hematology/oncology recommended outpatient PET scan and lymph node biopsy.  If patient is to receive interval appendectomy, lymph biopsy may be performed at that time.       On day of discharge, patient was tolerating regular diet, ambulating, and pain was controlled.     She will be discharged on augmentin for a total of 10 days.   She will need an outpatient colonoscopy in 6-8 weeks.   Patient to follow up outpatient with hem/onc regarding lymphadenopathy.      - Will set up an appointment for patient to follow up at [facility] (likely with Dr. Davila) in 1-2 weeks. Discussed with surgical team; pt likely to follow up with them and may consider appendectomy outpatient. If so, may be able to set up a lymph node biopsy concurrently with surgery, to be further determined as an outpatient  - Would also recommend PET/CT to be performed outpatient  - No contraindications to discharge from a Hematology standpoint if patient is not having surgery inpatient

## 2022-02-22 NOTE — DISCHARGE NOTE NURSING/CASE MANAGEMENT/SOCIAL WORK - NSDCFUADDAPPT_GEN_ALL_CORE_FT
Gastroenterology at [facility]  [address] [id number] [phone]		    [facility]	Hematology/Oncology	  [address] [id number] [phone]				  Please follow up with your Surgery Doctor only if needed at [address] [facility] [address] [place] [id number]; Phone #[phone]. (If you have a Right Lower quadrant pain or doesn’t completely subside).

## 2022-02-22 NOTE — DISCHARGE NOTE NURSING/CASE MANAGEMENT/SOCIAL WORK - NSDCPEFALRISK_GEN_ALL_CORE
For information on Fall & Injury Prevention, visit: [email or web address] OR  [email or web address] OR  https://www.cdc.gov/steadi/patient.html

## 2022-02-22 NOTE — DISCHARGE NOTE NURSING/CASE MANAGEMENT/SOCIAL WORK - PATIENT PORTAL LINK FT
You can access the FollowMyHealth Patient Portal offered by [facility] by registering at the following website: [email or web address]. By joining [employer]’s FollowMyHealth portal, you will also be able to view your health information using other applications (apps) compatible with our system.

## 2022-07-27 ENCOUNTER — APPOINTMENT (OUTPATIENT)
Dept: PULMONOLOGY | Facility: CLINIC | Age: 52
End: 2022-07-27

## 2022-07-27 VITALS
TEMPERATURE: 98.2 F | WEIGHT: 230 LBS | BODY MASS INDEX: 45.16 KG/M2 | HEIGHT: 60 IN | OXYGEN SATURATION: 98 % | HEART RATE: 98 BPM | DIASTOLIC BLOOD PRESSURE: 84 MMHG | SYSTOLIC BLOOD PRESSURE: 138 MMHG

## 2022-07-27 DIAGNOSIS — R50.9 FEVER, UNSPECIFIED: ICD-10-CM

## 2022-07-27 DIAGNOSIS — R09.89 OTHER SPECIFIED SYMPTOMS AND SIGNS INVOLVING THE CIRCULATORY AND RESPIRATORY SYSTEMS: ICD-10-CM

## 2022-07-27 DIAGNOSIS — R06.2 WHEEZING: ICD-10-CM

## 2022-07-27 DIAGNOSIS — R93.89 ABNORMAL FINDINGS ON DIAGNOSTIC IMAGING OF OTHER SPECIFIED BODY STRUCTURES: ICD-10-CM

## 2022-07-27 PROCEDURE — 99205 OFFICE O/P NEW HI 60 MIN: CPT | Mod: 25

## 2022-07-27 PROCEDURE — 71046 X-RAY EXAM CHEST 2 VIEWS: CPT

## 2022-07-27 RX ORDER — PREDNISONE 10 MG/1
10 TABLET ORAL
Qty: 40 | Refills: 0 | Status: ACTIVE | COMMUNITY
Start: 2022-07-27 | End: 1900-01-01

## 2022-07-27 RX ORDER — DOXYCYCLINE HYCLATE 100 MG/1
100 CAPSULE ORAL TWICE DAILY
Qty: 20 | Refills: 0 | Status: ACTIVE | COMMUNITY
Start: 2022-07-27 | End: 1900-01-01

## 2022-07-27 RX ORDER — NIRMATRELVIR AND RITONAVIR 300-100 MG
20 X 150 MG & KIT ORAL
Qty: 30 | Refills: 0 | Status: DISCONTINUED | COMMUNITY
Start: 2022-06-10

## 2022-07-27 RX ORDER — ALBUTEROL SULFATE 90 UG/1
108 (90 BASE) INHALANT RESPIRATORY (INHALATION)
Qty: 1 | Refills: 5 | Status: ACTIVE | COMMUNITY
Start: 2022-07-27 | End: 1900-01-01

## 2022-07-28 PROBLEM — R93.89 ABNORMAL CHEST X-RAY: Status: ACTIVE | Noted: 2022-07-28

## 2022-07-28 NOTE — HISTORY OF PRESENT ILLNESS
[Former] : former [TextBox_4] : GOVIND MENDEZ is a 52 year old  F referred for pulmonary evaluation for persistent cough and chest congestion.\par \par Early 6/22 became ill. Tested negative. Treated with amoxicillin.\par SYmptoms cough with purulent secretions, cough.\par Develop\par Then took Z desi\par Went to ENT for sore throat and ear pain treated with Cefdinir with positive response. Completed 7/15/22\par Then recurred. \par This AM temp 101 and 99 at [facility]\par Persistent chest and upper airway congestion\par Positive wheezing \par No SOB\par Multiple COVID testing negative.\par Positive dark yellow mucous. \par \par Past pulmonary Hx N\par Occupational Exposure. N\par Family history of pulmonary disease. N\par Recent travel On ship to [facility] [place] came home yesterday AM\par Pets Cats not allergic.\par \par No GERD\par \par ? Had CXR 1 month ago\par \par No DM\par  [TextBox_11] : 4 cigs/day [TextBox_13] : 5-10 [YearQuit] : 2012

## 2022-07-28 NOTE — PHYSICAL EXAM
[No Acute Distress] : no acute distress [Supple] : supple [Thyroid Not Enlarged] : thyroid not enlarged [No JVD] : no jvd [No Abnormalities] : no abnormalities [Benign] : benign [Not Tender] : not tender [No HSM] : no hsm [No Clubbing] : no clubbing [No Cyanosis] : no cyanosis [No Edema] : no edema [Oriented x3] : oriented x3 [TextBox_68] : 1+ bibasilar rhonchi.  Occasional wheeze.

## 2022-07-28 NOTE — ASSESSMENT
[FreeTextEntry1] : Course doxycycline\par Course prednisone\par Albuterol PRN\par RVP sent.\par F/U 3 weeks sooner PRN\par CXR repeat RTO\par \par 80 minutes spent in evaluation management and review of studies.

## 2022-07-28 NOTE — CONSULT LETTER
[Dear  ___] : Dear ~ROD, [Consult Letter:] : I had the pleasure of evaluating your patient, [unfilled]. [Consult Closing:] : Thank you very much for allowing me to participate in the care of this patient.  If you have any questions, please do not hesitate to contact me. [Sincerely,] : Sincerely, [FreeTextEntry2] : Tu Paredes MD\par  [FreeTextEntry3] : Suresh Soriano MD FCCP\par \par

## 2022-07-28 NOTE — DISCUSSION/SUMMARY
[FreeTextEntry1] : Symptom complex most likely related to asthmatic bronchitis\par No history of underlying pulmonary disease or asthma.\par Possible nodule left lung on chest radiograph.

## 2022-07-29 LAB
RAPID RVP RESULT: DETECTED
RV+EV RNA SPEC QL NAA+PROBE: DETECTED
SARS-COV-2 RNA PNL RESP NAA+PROBE: NOT DETECTED

## 2022-08-31 ENCOUNTER — APPOINTMENT (OUTPATIENT)
Dept: PULMONOLOGY | Facility: CLINIC | Age: 52
End: 2022-08-31

## 2024-02-01 NOTE — ED ADULT NURSE NOTE - NS ED NOTE ABUSE SUSPICION NEGLECT YN
Note:  Ref by Dr. Yayo Lorenzo for abnormal chest CTA chest 1/30/24 IMPRESSION:  Modest calcified atherosclerotic disease. Ascending aorta = 3.3 cm  in maximum diameter. There is no focal aneurysm or stenosis in the thoracic  aorta.     Two, small, fairly well-circumscribed densities in the right middle lobe. Right  lower lobe curvilinear scar versus atelectasis. Recommend follow-up CT scan of  the chest in 3-6 months to confirm stability.      Patient will arrive on 2/7 at 3 pm to see Dr Huynh. Confirms knowledge of check in location.   No